# Patient Record
Sex: MALE | Race: ASIAN | NOT HISPANIC OR LATINO
[De-identification: names, ages, dates, MRNs, and addresses within clinical notes are randomized per-mention and may not be internally consistent; named-entity substitution may affect disease eponyms.]

---

## 2021-07-28 PROBLEM — Z00.00 ENCOUNTER FOR PREVENTIVE HEALTH EXAMINATION: Status: ACTIVE | Noted: 2021-07-28

## 2021-08-02 PROBLEM — Z87.19 HISTORY OF GASTRITIS: Status: RESOLVED | Noted: 2021-08-02 | Resolved: 2021-08-02

## 2021-08-02 PROBLEM — Z87.438 HISTORY OF BPH: Status: RESOLVED | Noted: 2021-08-02 | Resolved: 2021-08-02

## 2021-08-02 PROBLEM — Z86.73 HISTORY OF CVA (CEREBROVASCULAR ACCIDENT): Status: RESOLVED | Noted: 2021-08-02 | Resolved: 2021-08-02

## 2021-08-02 PROBLEM — Z86.79 HISTORY OF ATRIAL FIBRILLATION: Status: RESOLVED | Noted: 2021-08-02 | Resolved: 2021-08-02

## 2021-08-02 PROBLEM — N18.9 CHRONIC RENAL INSUFFICIENCY: Status: RESOLVED | Noted: 2021-08-02 | Resolved: 2021-08-02

## 2021-08-02 PROBLEM — Z86.03 HISTORY OF MONOCLONAL GAMMOPATHY: Status: RESOLVED | Noted: 2021-08-02 | Resolved: 2021-08-02

## 2021-08-02 PROBLEM — Z86.2 HISTORY OF THALASSEMIA: Status: RESOLVED | Noted: 2021-08-02 | Resolved: 2021-08-02

## 2021-08-02 PROBLEM — Z86.79 HISTORY OF HEART FAILURE: Status: RESOLVED | Noted: 2021-08-02 | Resolved: 2021-08-02

## 2021-08-02 RX ORDER — METOPROLOL SUCCINATE 200 MG/1
200 TABLET, EXTENDED RELEASE ORAL
Refills: 0 | Status: ACTIVE | COMMUNITY

## 2021-08-02 RX ORDER — TAMSULOSIN HYDROCHLORIDE 0.4 MG/1
0.4 CAPSULE ORAL
Refills: 0 | Status: ACTIVE | COMMUNITY

## 2021-08-02 RX ORDER — NEBIVOLOL HYDROCHLORIDE 20 MG/1
20 TABLET ORAL
Refills: 0 | Status: ACTIVE | COMMUNITY

## 2021-08-02 RX ORDER — DILTIAZEM HYDROCHLORIDE 120 MG/1
120 CAPSULE, EXTENDED RELEASE ORAL
Refills: 0 | Status: ACTIVE | COMMUNITY

## 2021-08-02 RX ORDER — LINAGLIPTIN 5 MG/1
5 TABLET, FILM COATED ORAL
Refills: 0 | Status: ACTIVE | COMMUNITY

## 2021-08-02 RX ORDER — MULTI VITAMIN/MINERAL SUPPLEMENT WITH ASCORBIC ACID, NIACIN, PYRIDOXINE, PANTOTHENIC ACID, FOLIC ACID, RIBOFLAVIN, THIAMIN, BIOTIN, COBALAMIN AND ZINC. 60; 20; 12.5; 10; 10; 1.7; 1.5; 1; .3; .006 MG/1; MG/1; MG/1; MG/1; MG/1; MG/1; MG/1; MG/1; MG/1; MG/1
TABLET, COATED ORAL
Refills: 0 | Status: ACTIVE | COMMUNITY

## 2021-08-02 RX ORDER — SIMVASTATIN 5 MG/1
5 TABLET, FILM COATED ORAL
Refills: 0 | Status: ACTIVE | COMMUNITY

## 2021-08-02 RX ORDER — ICOSAPENT ETHYL 1000 MG/1
1 CAPSULE ORAL
Refills: 0 | Status: ACTIVE | COMMUNITY

## 2021-08-02 RX ORDER — AZILSARTAN KAMEDOXOMIL 40 MG/1
40 TABLET ORAL
Refills: 0 | Status: ACTIVE | COMMUNITY

## 2021-08-02 RX ORDER — GLIPIZIDE 2.5 MG/1
2.5 TABLET, FILM COATED, EXTENDED RELEASE ORAL
Refills: 0 | Status: ACTIVE | COMMUNITY

## 2021-08-02 RX ORDER — RIVAROXABAN 15 MG/1
15 TABLET, FILM COATED ORAL
Refills: 0 | Status: ACTIVE | COMMUNITY

## 2021-08-02 RX ORDER — RANOLAZINE 500 MG/1
500 TABLET, EXTENDED RELEASE ORAL
Refills: 0 | Status: ACTIVE | COMMUNITY

## 2021-08-06 ENCOUNTER — APPOINTMENT (OUTPATIENT)
Dept: THORACIC SURGERY | Facility: CLINIC | Age: 76
End: 2021-08-06
Payer: MEDICARE

## 2021-08-06 VITALS
DIASTOLIC BLOOD PRESSURE: 90 MMHG | WEIGHT: 197 LBS | OXYGEN SATURATION: 97 % | TEMPERATURE: 97.8 F | HEIGHT: 67 IN | BODY MASS INDEX: 30.92 KG/M2 | HEART RATE: 74 BPM | SYSTOLIC BLOOD PRESSURE: 180 MMHG | RESPIRATION RATE: 17 BRPM

## 2021-08-06 DIAGNOSIS — Z86.79 PERSONAL HISTORY OF OTHER DISEASES OF THE CIRCULATORY SYSTEM: ICD-10-CM

## 2021-08-06 DIAGNOSIS — Z86.03 PERSONAL HISTORY OF NEOPLASM OF UNCERTAIN BEHAVIOR: ICD-10-CM

## 2021-08-06 DIAGNOSIS — Z87.438 PERSONAL HISTORY OF OTHER DISEASES OF MALE GENITAL ORGANS: ICD-10-CM

## 2021-08-06 DIAGNOSIS — Z87.19 PERSONAL HISTORY OF OTHER DISEASES OF THE DIGESTIVE SYSTEM: ICD-10-CM

## 2021-08-06 DIAGNOSIS — Z86.2 PERSONAL HISTORY OF DISEASES OF THE BLOOD AND BLOOD-FORMING ORGANS AND CERTAIN DISORDERS INVOLVING THE IMMUNE MECHANISM: ICD-10-CM

## 2021-08-06 DIAGNOSIS — Z09 ENCOUNTER FOR FOLLOW-UP EXAMINATION AFTER COMPLETED TREATMENT FOR CONDITIONS OTHER THAN MALIGNANT NEOPLASM: ICD-10-CM

## 2021-08-06 DIAGNOSIS — Z86.73 PERSONAL HISTORY OF TRANSIENT ISCHEMIC ATTACK (TIA), AND CEREBRAL INFARCTION W/OUT RESIDUAL DEFICITS: ICD-10-CM

## 2021-08-06 DIAGNOSIS — N18.9 CHRONIC KIDNEY DISEASE, UNSPECIFIED: ICD-10-CM

## 2021-08-06 PROCEDURE — 99203 OFFICE O/P NEW LOW 30 MIN: CPT

## 2021-08-06 NOTE — PHYSICAL EXAM
[General Appearance - Alert] : alert [] : no respiratory distress [Respiration, Rhythm And Depth] : normal respiratory rhythm and effort [Exaggerated Use Of Accessory Muscles For Inspiration] : no accessory muscle use [Auscultation Breath Sounds / Voice Sounds] : lungs were clear to auscultation bilaterally [Apical Impulse] : the apical impulse was normal [Heart Rate And Rhythm] : heart rate was normal and rhythm regular [Heart Sounds] : normal S1 and S2 [2+] : left 2+ [Abnormal Walk] : normal gait

## 2021-08-16 NOTE — CONSULT LETTER
[FreeTextEntry3] : Matt Munoz MD\par Professor, Cardiovascular & Thoracic Surgery\par [facility] [employer]\par Director of the [facility] \par Director of Thoracic Surgery, [facility]\par \par [facility]\par [address]\par [facility] 4th Floor\par [place] [address]\par Phone: [phone]\par Fax: [phone]

## 2021-08-16 NOTE — HISTORY OF PRESENT ILLNESS
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

## 2021-08-16 NOTE — ASSESSMENT
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 \par \par Patient admits to TSE. Patient denies cough, hemoptysis, weight change, nausea, vomiting, diarrhea, chest pain, fever, or any recent illnesses. \par \par Will obtain MRI chest images to review and will order a CT chest with IV contrast due to dyspnea on exertion. \par \par PLAN:\par 1. CT chest with IV contrast \par \par I, SAMM MACDONALD , am scribing for and in the presence of [Dr. Matt Munoz] the following sections: History of present illness, past Medical/family/surgical/family/social history, review of systems, vital signs, physical exam and disposition.\par  \par

## 2021-08-16 NOTE — END OF VISIT
[Time Spent: ___ minutes] : I have spent [unfilled] minutes of time on the encounter. [FreeTextEntry3] : \par I personally performed the services described in the documentation, reviewed the documentation recorded by the scribe in my presence and it accurately and completely records my words and actions.

## 2021-08-27 ENCOUNTER — APPOINTMENT (OUTPATIENT)
Dept: THORACIC SURGERY | Facility: CLINIC | Age: 76
End: 2021-08-27
Payer: MEDICARE

## 2021-08-27 VITALS
HEIGHT: 67 IN | TEMPERATURE: 98.3 F | RESPIRATION RATE: 17 BRPM | BODY MASS INDEX: 30.45 KG/M2 | WEIGHT: 194 LBS | OXYGEN SATURATION: 96 % | SYSTOLIC BLOOD PRESSURE: 155 MMHG | HEART RATE: 71 BPM | DIASTOLIC BLOOD PRESSURE: 93 MMHG

## 2021-08-27 PROCEDURE — 99215 OFFICE O/P EST HI 40 MIN: CPT

## 2021-08-27 NOTE — PHYSICAL EXAM
[] : no respiratory distress [Respiration, Rhythm And Depth] : normal respiratory rhythm and effort [Exaggerated Use Of Accessory Muscles For Inspiration] : no accessory muscle use [Apical Impulse] : the apical impulse was normal [Abnormal Walk] : normal gait [Musculoskeletal - Swelling] : no joint swelling seen

## 2021-08-30 NOTE — HISTORY OF PRESENT ILLNESS
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 Patient presents today for a follow-up visit with CT Chest. \par \par MRI Chest 7/7/21\par - anterior mediastinal lesion 2.1 x 1.1cm, compatible with thymic cyst. \par  \par CT Chest 8/12/21\par - Right perihilar soft tissue nodule measuring up to 2.7cm, which abuts the right superior pulmonary vein. Differential diagnosis includes pulmonary nodule vs aneurysm. \par - Anterior mediastinal lesion measuring up to 2.0cm with soft tissue attenuation.\par - Small bilateral pleural effusion\par - Sequela of prior granulomatous disease\par - CAD\par

## 2021-08-30 NOTE — CONSULT LETTER
[FreeTextEntry3] : Matt Munoz MD\par Professor, Cardiovascular & Thoracic Surgery\par [employer] [facility] [employer]\par Director of the [facility] \par Director of Thoracic Surgery, [facility]\par \par [facility]\par [address]\par [facility] 4th Floor\par [place] [address]\par Phone: [phone]\par Fax: [phone]

## 2021-08-30 NOTE — END OF VISIT
[Time Spent: ___ minutes] : I have spent [unfilled] minutes of time on the encounter. [FreeTextEntry3] : I, NARESH LEOS , am scribing for and in the presence of SHUKRI CHATTERJEE the following sections: history of present illness, past medical/family/surgical/family/social history, review of systems, vital signs, physical exam, and disposition.\par  \par I personally performed the services described in the documentation, reviewed the documentation recorded by the scribe in my presence and it accurately and completely records my words and actions.

## 2021-08-30 NOTE — ASSESSMENT
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 Patient presents today for a follow-up visit with CT Chest. \par \par MRI Chest 7/7/21\par - anterior mediastinal lesion 2.1 x 1.1cm, compatible with thymic cyst. \par  \par CT Chest 8/12/21\par - Right perihilar soft tissue nodule measuring up to 2.7cm, which abuts the right superior pulmonary vein. Differential diagnosis includes pulmonary nodule vs aneurysm. \par - Anterior mediastinal lesion measuring up to 2.0cm with soft tissue attenuation.\par - Small bilateral pleural effusion\par - Sequela of prior granulomatous disease\par - CAD\par \par \par Patient doing well. Denies SOB, cough, hemoptysis, chest discomfort, fever/chills. \par \par CT Chest 8/12/21 reviewed with patient - right perihilar soft tissue mass concerning for lung nodule vs aneurysm. Will request previous scans for review. Will order PET to further evaluate. \par \par I have reviewed the patient's medical records and diagnostic images at the time of this office consultation and have made the following recommendation.\par Plan:\par 1. PET \par 2. Request CTA Chest 7/2\par 3. RTC to discuss next steps.

## 2021-09-17 ENCOUNTER — APPOINTMENT (OUTPATIENT)
Dept: THORACIC SURGERY | Facility: CLINIC | Age: 76
End: 2021-09-17
Payer: MEDICARE

## 2021-09-17 VITALS
HEART RATE: 70 BPM | TEMPERATURE: 98 F | WEIGHT: 195 LBS | SYSTOLIC BLOOD PRESSURE: 185 MMHG | OXYGEN SATURATION: 98 % | RESPIRATION RATE: 17 BRPM | HEIGHT: 67 IN | BODY MASS INDEX: 30.61 KG/M2 | DIASTOLIC BLOOD PRESSURE: 79 MMHG

## 2021-09-17 DIAGNOSIS — R91.1 SOLITARY PULMONARY NODULE: ICD-10-CM

## 2021-09-17 DIAGNOSIS — R91.8 OTHER NONSPECIFIC ABNORMAL FINDING OF LUNG FIELD: ICD-10-CM

## 2021-09-17 DIAGNOSIS — R93.89 ABNORMAL FINDINGS ON DIAGNOSTIC IMAGING OF OTHER SPECIFIED BODY STRUCTURES: ICD-10-CM

## 2021-09-17 PROCEDURE — 99213 OFFICE O/P EST LOW 20 MIN: CPT

## 2021-09-20 PROBLEM — R91.1 LUNG NODULE SEEN ON IMAGING STUDY: Status: RESOLVED | Noted: 2021-09-17 | Resolved: 2021-09-20

## 2021-09-20 PROBLEM — R93.89 ABNORMAL CHEST CT: Status: ACTIVE | Noted: 2021-09-17

## 2021-09-20 NOTE — CONSULT LETTER
[Courtesy Letter:] : I had the pleasure of seeing your patient, [unfilled], in my office today. [Please see my note below.] : Please see my note below. [Consult Closing:] : Thank you very much for allowing me to participate in the care of this patient.  If you have any questions, please do not hesitate to contact me. [Sincerely,] : Sincerely, [FreeTextEntry3] : Matt Munoz MD\par Professor, Cardiovascular & Thoracic Surgery\par [facility] [employer]\par Director of the [facility] \par Director of Thoracic Surgery, [facility]\par \par [facility]\par [address]\par [facility] 4th Floor\par [place] [address]\par Phone: [phone]\par Fax: [phone]

## 2021-09-20 NOTE — ASSESSMENT
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 \par  \par CT Chest 8/12/21\par - Right perihilar soft tissue nodule measuring up to 2.7cm, which abuts the right superior pulmonary vein. Differential diagnosis includes pulmonary nodule vs aneurysm. \par - Anterior mediastinal lesion measuring up to 2.0cm with soft tissue attenuation.\par - Small bilateral pleural effusion\par - Sequela of prior granulomatous disease\par - CAD\par \par PET 09/11/2021\par - 2.5 x 1.6 cm low-attenuation collection may represent a complex cyst - no FDG activity\par - stable 2.7 x 2.2 cm well-circumscribed low-attenuation nodule abutting the right superior pulmonary vein, \par SUV 3.2\par - trace bilateral pleural effusions decreased in volume \par \par PET 09/11/2021 and CTA Chest 07/02/2021 were reviewed with the patient, and the thymic cyst is stable. 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 This is was seen on all the scans and will be followed up on when the patient RTC in 1 year.  \par \par I have reviewed the patient’s medical records and diagnostic images during the time of this office visit and have made the following recommendations.\par Plan:\par 1. RTC in 1 year with MRI chest w/o contrast

## 2021-09-20 NOTE — END OF VISIT
[FreeTextEntry3] : I, DOROTHEA ABRAHAM, am scribing for and in the presence of Dr. SHUKRI CHATTERJEE the following sections: history of present illness, past medical/surgical/family/social/ allergy history, review of systems, vital signs, physical exam, and disposition.\par \par I personally performed the services described in the documentation, reviewed the documentation recorded by the scribe in my presence and it accurately and completely records my words and actions.

## 2021-09-20 NOTE — PHYSICAL EXAM
[Sclera] : the sclera and conjunctiva were normal [Neck Appearance] : the appearance of the neck was normal [] : no respiratory distress [Respiration, Rhythm And Depth] : normal respiratory rhythm and effort [Exaggerated Use Of Accessory Muscles For Inspiration] : no accessory muscle use [Heart Rate And Rhythm] : heart rate was normal and rhythm regular [Heart Sounds] : normal S1 and S2 [Examination Of The Chest] : the chest was normal in appearance [2+] : left 2+ [No Pulse Delay] : no pulse delay [Bowel Sounds] : normal bowel sounds [Abdomen Soft] : soft [Abdomen Tenderness] : non-tender [Abnormal Walk] : normal gait [Skin Color & Pigmentation] : normal skin color and pigmentation [Sensation] : the sensory exam was normal to light touch and pinprick [Motor Exam] : the motor exam was normal [Oriented To Time, Place, And Person] : oriented to person, place, and time [Fingers] :  capillary refill of the fingers was normal [FreeTextEntry1] : na

## 2021-09-20 NOTE — HISTORY OF PRESENT ILLNESS
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 \par  \par CT Chest 8/12/21\par - Right perihilar soft tissue nodule measuring up to 2.7cm, which abuts the right superior pulmonary vein. Differential diagnosis includes pulmonary nodule vs aneurysm. \par - Anterior mediastinal lesion measuring up to 2.0cm with soft tissue attenuation.\par - Small bilateral pleural effusion\par - Sequela of prior granulomatous disease\par - CAD\par \par PET 09/11/2021\par - 2.5 x 1.6 cm low-attenuation collection may represent a complex cyst - no FDG activity\par - stable 2.7 x 2.2 cm well-circumscribed low-attenuation nodule abutting the right superior pulmonary vein, \par SUV 3.2\par - trace bilateral pleural effusions decreased in volume

## 2022-09-08 VITALS — HEIGHT: 69 IN | WEIGHT: 194.01 LBS

## 2022-09-08 RX ORDER — CHLORHEXIDINE GLUCONATE 213 G/1000ML
1 SOLUTION TOPICAL ONCE
Refills: 0 | Status: DISCONTINUED | OUTPATIENT
Start: 2022-09-14 | End: 2022-09-28

## 2022-09-08 NOTE — H&P ADULT - NSICDXPASTMEDICALHX_GEN_ALL_CORE_FT
PAST MEDICAL HISTORY:  Afib     CAD (coronary artery disease)     Chronic kidney disease (CKD)     DM (diabetes mellitus)     H/O varicose veins     History of TIAs     HTN (hypertension)     Monoclonal gammopathy     PVD (peripheral vascular disease)

## 2022-09-08 NOTE — H&P ADULT - HISTORY OF PRESENT ILLNESS
COVID:   Cardiologist: Dr. Bola Peres  Pharmacy:  Escort:     78 yo Mandarian speaking male with PMHx of HTN, DM, Afib (on Xarelto, last dose ______), TIA, PVD s/p PTA L SFA, L RANULFO, R SFA, and R AT, CKD stage 3, CAD s/p dx cath at [facility] 9/2011 per MD note, mild CAD with mLAD myocardial bridge, varicose veins, monocolonal gammopathy presented to her cardiologist office's, Dr. Peres c/o intermittent substernal chest pain for few days. Describing it as pressure, non-radiating, nonexertional with no precipitating factors, and relief with rest. Denies palpitations, dizziness, syncope, orthopnea, PND, LE edema. NST 8/3/2022: moderate perfusion abnormality of moderate inintensity is present in the anterior region which was reversible in the rest images. ECHO 4/12/2022: EF 67%, severely dilated left atrium, aortic valve sclerosis, trace MR and TR.    COVID:   Cardiologist: Dr. Bola Peres  Pharmacy:  Escort:     76 yo Mandarian speaking male with PMHx of HTN, DM, Afib (on Xarelto, last dose ______), TIA, PVD s/p PTA L SFA, L RANULFO, R SFA, and R AT, CKD stage 3, CAD s/p dx cath at [facility] 9/2011 per MD note, mild CAD with mLAD myocardial bridge, varicose veins, monocolonal gammopathy presented to her cardiologist office's, Dr. Peres c/o intermittent substernal chest pain for few days. Describing it as pressure, non-radiating, nonexertional with no precipitating factors, and relief with rest. Denies palpitations, dizziness, syncope, orthopnea, PND, LE edema. NST 8/3/2022: moderate perfusion abnormality of moderate inintensity is present in the anterior region which was reversible in the rest images. ECHO 4/12/2022: EF 67%, severely dilated left atrium, aortic valve sclerosis, trace MR and TR.     In light of pt's risk factors, CCS class II anginal symptoms, and abnormal NST result, pt is now referred for cardiac cath with possible intervention is clinically indicated.    COVID: (-) in chart   Cardiologist: Dr. Bola Peres  Pharmacy: [id number] (in [place])    78 yo [place] speaking male with PMHx of HTN, DM, Afib (on Xarelto, last dose LAST NIGHT 9/13/22), TIA, PVD s/p PTA L SFA, L RANULFO, R SFA, and R AT, CKD stage 3, CAD s/p dx cath at [facility] 9/2011 per MD note, mild CAD with mLAD myocardial bridge, varicose veins, monocolonal gammopathy presented to her cardiologist office's, Dr. Peres c/o intermittent substernal chest pain for few days. Describing it as pressure, non-radiating, nonexertional with no precipitating factors, and relief with rest. Denies palpitations, dizziness, syncope, orthopnea, PND, LE edema. NST 8/3/2022: moderate perfusion abnormality of moderate inintensity is present in the anterior region which was reversible in the rest images. ECHO 4/12/2022: EF 67%, severely dilated left atrium, aortic valve sclerosis, trace MR and TR.     In light of pt's risk factors, CCS class II anginal symptoms, and abnormal NST result, pt is now referred for cardiac cath with possible intervention is clinically indicated.    COVID: (-) in chart   Cardiologist: Dr. Bola Peres  Pharmacy: [id number] (in [place])    76 yo [place] speaking male with PMHx of HTN, DM, Afib (on Xarelto, last dose LAST NIGHT 9/13/22), TIA, PVD s/p PTA L SFA, L RANULFO, R SFA, and R AT, CKD stage 3, CAD s/p dx cath at [facility] 9/2011 per MD note, mild CAD with mLAD myocardial bridge, varicose veins, monocolonal gammopathy presented to her cardiologist office's, Dr. Peres c/o intermittent substernal chest pain for few days. Describing it as pressure, non-radiating, nonexertional with no precipitating factors, and relief with rest. Denies palpitations, dizziness, syncope, orthopnea, PND, LE edema. NST 8/3/2022: moderate perfusion abnormality of moderate inintensity is present in the anterior region which was reversible in the rest images. ECHO 4/12/2022: EF 67%, severely dilated left atrium, aortic valve sclerosis, trace MR and TR.     In light of pt's risk factors, CCS class II anginal symptoms, and abnormal NST result, pt is now referred for cardiac cath with possible intervention is clinically indicated.

## 2022-09-08 NOTE — H&P ADULT - NSHPLABSRESULTS_GEN_ALL_CORE
13.7   5.17  )-----------( 135      ( 14 Sep 2022 07:46 )             43.2     139  |  104  |  29<H>  ----------------------------<  139<H>  3.8   |  27  |  1.63<H>    Ca    9.3      14 Sep 2022 07:46    Mg     2.2     09-14    TPro  8.0  /  Alb  4.8  /  TBili  0.8  /  DBili  x   /  AST  23  /  ALT  26  /  AlkPhos  78  09-14    PT/INR - ( 14 Sep 2022 07:46 )   PT: 11.7 sec;   INR: 0.98       PTT - ( 14 Sep 2022 07:46 )  PTT:28.7 sec    CARDIAC MARKERS ( 14 Sep 2022 07:46 )  x     / x     / 120 U/L / x     / 1.7 ng/mL    EKG: Afib; no acute ischemic changes noted.

## 2022-09-12 ENCOUNTER — APPOINTMENT (OUTPATIENT)
Dept: THORACIC SURGERY | Facility: CLINIC | Age: 77
End: 2022-09-12

## 2022-09-12 PROCEDURE — 99442: CPT

## 2022-09-12 NOTE — ASSESSMENT
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 \par \par MRI chest w/o and w/ contrast on 09/08/22 was reviewed with the patient. The anterior mediastinal/thymic cyst is stable at 3.3cm. The ateriovenous varix is stable at 2.7cm as well. Patient reported will have cardiac cath later this month for CAD. He otherwise denies fatigue, weakness, drooping eyes, facial redness/swelling, trouble swallowing. I suggested a follow up in one year with MRI chest w/o contrast. Pt verbally understood and agreed with the plan. \par \par Plan:\par MRI chest without contrast in one year

## 2022-09-12 NOTE — HISTORY OF PRESENT ILLNESS
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 \par  \par CT Chest 8/12/21\par - Right perihilar soft tissue nodule measuring up to 2.7cm, which abuts the right superior pulmonary vein. Differential diagnosis includes pulmonary nodule vs aneurysm. \par - Anterior mediastinal lesion measuring up to 2.0cm with soft tissue attenuation.\par - Small bilateral pleural effusion\par - Sequela of prior granulomatous disease\par - CAD\par \par PET 09/11/2021\par - 2.5 x 1.6 cm low-attenuation collection may represent a complex cyst - no FDG activity\par - stable 2.7 x 2.2 cm well-circumscribed low-attenuation nodule abutting the right superior pulmonary vein, \par SUV 3.2\par - trace bilateral pleural effusions decreased in volume \par \par He presents today with a chest MRI. Patient reports will have cardiac cath later this month for CAD. He otherwise denies fatigue, weakness, drooping eyes, facial redness/swelling, trouble swallowing. \par \par MRI chest w/o and w/ contrast on 09/08/22\par - Stable 3.3 cm anterior mediastinal/thymic cyst.\par - 2.7 cm avidly enhancing right perihilar lesion located adjacent to the right middle pulmonary artery and right superior pulmonary vein.  This is grossly stable in size since 07/2021. \par - Trace bilateral pleural effusions. \par - Stable mild cardiomegaly. \par

## 2022-09-12 NOTE — REASON FOR VISIT
[Follow-Up: _____] : a [unfilled] follow-up visit [Home] : at home, [unfilled] , at the time of the visit. [Medical Office: ([facility])___] : at the medical office located in  [Verbal consent obtained from patient] : the patient, [unfilled]

## 2022-09-13 PROBLEM — I25.10 ATHEROSCLEROTIC HEART DISEASE OF NATIVE CORONARY ARTERY WITHOUT ANGINA PECTORIS: Chronic | Status: ACTIVE | Noted: 2022-09-08

## 2022-09-13 PROBLEM — E11.9 TYPE 2 DIABETES MELLITUS WITHOUT COMPLICATIONS: Chronic | Status: ACTIVE | Noted: 2022-09-08

## 2022-09-13 PROBLEM — I48.91 UNSPECIFIED ATRIAL FIBRILLATION: Chronic | Status: ACTIVE | Noted: 2022-09-08

## 2022-09-13 PROBLEM — D47.2 MONOCLONAL GAMMOPATHY: Chronic | Status: ACTIVE | Noted: 2022-09-08

## 2022-09-13 PROBLEM — I10 ESSENTIAL (PRIMARY) HYPERTENSION: Chronic | Status: ACTIVE | Noted: 2022-09-08

## 2022-09-13 PROBLEM — N18.9 CHRONIC KIDNEY DISEASE, UNSPECIFIED: Chronic | Status: ACTIVE | Noted: 2022-09-08

## 2022-09-13 PROBLEM — Z86.79 PERSONAL HISTORY OF OTHER DISEASES OF THE CIRCULATORY SYSTEM: Chronic | Status: ACTIVE | Noted: 2022-09-08

## 2022-09-13 PROBLEM — I73.9 PERIPHERAL VASCULAR DISEASE, UNSPECIFIED: Chronic | Status: ACTIVE | Noted: 2022-09-08

## 2022-09-13 PROBLEM — Z86.73 PERSONAL HISTORY OF TRANSIENT ISCHEMIC ATTACK (TIA), AND CEREBRAL INFARCTION WITHOUT RESIDUAL DEFICITS: Chronic | Status: ACTIVE | Noted: 2022-09-08

## 2022-09-14 ENCOUNTER — OUTPATIENT (OUTPATIENT)
Dept: OUTPATIENT SERVICES | Facility: HOSPITAL | Age: 77
LOS: 1 days | Discharge: HOME | End: 2022-09-14
Payer: MEDICARE

## 2022-09-14 LAB
A1C WITH ESTIMATED AVERAGE GLUCOSE RESULT: 5.7 % — HIGH (ref 4–5.6)
ALBUMIN SERPL ELPH-MCNC: 4.8 G/DL — SIGNIFICANT CHANGE UP (ref 3.3–5)
ALP SERPL-CCNC: 78 U/L — SIGNIFICANT CHANGE UP (ref 40–120)
ALT FLD-CCNC: 26 U/L — SIGNIFICANT CHANGE UP (ref 10–45)
ANION GAP SERPL CALC-SCNC: 11 MMOL/L — SIGNIFICANT CHANGE UP (ref 5–17)
ANION GAP SERPL CALC-SCNC: 8 MMOL/L — SIGNIFICANT CHANGE UP (ref 5–17)
ANISOCYTOSIS BLD QL: SLIGHT — SIGNIFICANT CHANGE UP
APTT BLD: 28.7 SEC — SIGNIFICANT CHANGE UP (ref 27.5–35.5)
AST SERPL-CCNC: 23 U/L — SIGNIFICANT CHANGE UP (ref 10–40)
BASOPHILS # BLD AUTO: 0 K/UL — SIGNIFICANT CHANGE UP (ref 0–0.2)
BASOPHILS NFR BLD AUTO: 0 % — SIGNIFICANT CHANGE UP (ref 0–2)
BILIRUB SERPL-MCNC: 0.8 MG/DL — SIGNIFICANT CHANGE UP (ref 0.2–1.2)
BUN SERPL-MCNC: 27 MG/DL — HIGH (ref 7–23)
BUN SERPL-MCNC: 29 MG/DL — HIGH (ref 7–23)
CALCIUM SERPL-MCNC: 8.8 MG/DL — SIGNIFICANT CHANGE UP (ref 8.4–10.5)
CALCIUM SERPL-MCNC: 9.3 MG/DL — SIGNIFICANT CHANGE UP (ref 8.4–10.5)
CHLORIDE SERPL-SCNC: 104 MMOL/L — SIGNIFICANT CHANGE UP (ref 96–108)
CHLORIDE SERPL-SCNC: 105 MMOL/L — SIGNIFICANT CHANGE UP (ref 96–108)
CHOLEST SERPL-MCNC: 117 MG/DL — SIGNIFICANT CHANGE UP
CK MB CFR SERPL CALC: 1.7 NG/ML — SIGNIFICANT CHANGE UP (ref 0–6.7)
CK SERPL-CCNC: 120 U/L — SIGNIFICANT CHANGE UP (ref 30–200)
CO2 SERPL-SCNC: 21 MMOL/L — LOW (ref 22–31)
CO2 SERPL-SCNC: 27 MMOL/L — SIGNIFICANT CHANGE UP (ref 22–31)
CREAT SERPL-MCNC: 1.46 MG/DL — HIGH (ref 0.5–1.3)
CREAT SERPL-MCNC: 1.63 MG/DL — HIGH (ref 0.5–1.3)
EGFR: 43 ML/MIN/1.73M2 — LOW
EGFR: 50 ML/MIN/1.73M2 — LOW
EOSINOPHIL # BLD AUTO: 0 K/UL — SIGNIFICANT CHANGE UP (ref 0–0.5)
EOSINOPHIL NFR BLD AUTO: 0 % — SIGNIFICANT CHANGE UP (ref 0–6)
ESTIMATED AVERAGE GLUCOSE: 117 MG/DL — HIGH (ref 68–114)
GLUCOSE BLDC GLUCOMTR-MCNC: 114 MG/DL — HIGH (ref 70–99)
GLUCOSE SERPL-MCNC: 120 MG/DL — HIGH (ref 70–99)
GLUCOSE SERPL-MCNC: 139 MG/DL — HIGH (ref 70–99)
HCT VFR BLD CALC: 38.4 % — LOW (ref 39–50)
HCT VFR BLD CALC: 43.2 % — SIGNIFICANT CHANGE UP (ref 39–50)
HCV AB S/CO SERPL IA: 0.05 S/CO — SIGNIFICANT CHANGE UP
HCV AB SERPL-IMP: SIGNIFICANT CHANGE UP
HDLC SERPL-MCNC: 40 MG/DL — LOW
HGB BLD-MCNC: 12.2 G/DL — LOW (ref 13–17)
HGB BLD-MCNC: 13.7 G/DL — SIGNIFICANT CHANGE UP (ref 13–17)
INR BLD: 0.98 — SIGNIFICANT CHANGE UP (ref 0.88–1.16)
ISTAT ACTK (ACTIVATED CLOTTING TIME KAOLIN): 271 SEC — HIGH (ref 74–137)
ISTAT ACTK (ACTIVATED CLOTTING TIME KAOLIN): 306 SEC — HIGH (ref 74–137)
ISTAT INR: 1.3 — HIGH (ref 0.88–1.16)
ISTAT PT: 14.9 SEC — HIGH (ref 10–12.9)
LIPID PNL WITH DIRECT LDL SERPL: 61 MG/DL — SIGNIFICANT CHANGE UP
LYMPHOCYTES # BLD AUTO: 0.9 K/UL — LOW (ref 1–3.3)
LYMPHOCYTES # BLD AUTO: 17.4 % — SIGNIFICANT CHANGE UP (ref 13–44)
MACROCYTES BLD QL: SLIGHT — SIGNIFICANT CHANGE UP
MAGNESIUM SERPL-MCNC: 2 MG/DL — SIGNIFICANT CHANGE UP (ref 1.6–2.6)
MAGNESIUM SERPL-MCNC: 2.2 MG/DL — SIGNIFICANT CHANGE UP (ref 1.6–2.6)
MANUAL SMEAR VERIFICATION: SIGNIFICANT CHANGE UP
MCHC RBC-ENTMCNC: 22.4 PG — LOW (ref 27–34)
MCHC RBC-ENTMCNC: 22.7 PG — LOW (ref 27–34)
MCHC RBC-ENTMCNC: 31.7 GM/DL — LOW (ref 32–36)
MCHC RBC-ENTMCNC: 31.8 GM/DL — LOW (ref 32–36)
MCV RBC AUTO: 70.5 FL — LOW (ref 80–100)
MCV RBC AUTO: 71.5 FL — LOW (ref 80–100)
MICROCYTES BLD QL: SLIGHT — SIGNIFICANT CHANGE UP
MONOCYTES # BLD AUTO: 0.27 K/UL — SIGNIFICANT CHANGE UP (ref 0–0.9)
MONOCYTES NFR BLD AUTO: 5.2 % — SIGNIFICANT CHANGE UP (ref 2–14)
NEUTROPHILS # BLD AUTO: 4 K/UL — SIGNIFICANT CHANGE UP (ref 1.8–7.4)
NEUTROPHILS NFR BLD AUTO: 77.4 % — HIGH (ref 43–77)
NON HDL CHOLESTEROL: 77 MG/DL — SIGNIFICANT CHANGE UP
NRBC # BLD: 0 /100 WBCS — SIGNIFICANT CHANGE UP (ref 0–0)
OVALOCYTES BLD QL SMEAR: SLIGHT — SIGNIFICANT CHANGE UP
PLAT MORPH BLD: NORMAL — SIGNIFICANT CHANGE UP
PLATELET # BLD AUTO: 116 K/UL — LOW (ref 150–400)
PLATELET # BLD AUTO: 135 K/UL — LOW (ref 150–400)
POLYCHROMASIA BLD QL SMEAR: SLIGHT — SIGNIFICANT CHANGE UP
POTASSIUM SERPL-MCNC: 3.6 MMOL/L — SIGNIFICANT CHANGE UP (ref 3.5–5.3)
POTASSIUM SERPL-MCNC: 3.8 MMOL/L — SIGNIFICANT CHANGE UP (ref 3.5–5.3)
POTASSIUM SERPL-SCNC: 3.6 MMOL/L — SIGNIFICANT CHANGE UP (ref 3.5–5.3)
POTASSIUM SERPL-SCNC: 3.8 MMOL/L — SIGNIFICANT CHANGE UP (ref 3.5–5.3)
PROT SERPL-MCNC: 8 G/DL — SIGNIFICANT CHANGE UP (ref 6–8.3)
PROTHROM AB SERPL-ACNC: 11.7 SEC — SIGNIFICANT CHANGE UP (ref 10.5–13.4)
RBC # BLD: 5.45 M/UL — SIGNIFICANT CHANGE UP (ref 4.2–5.8)
RBC # BLD: 6.04 M/UL — HIGH (ref 4.2–5.8)
RBC # FLD: 16.2 % — HIGH (ref 10.3–14.5)
RBC # FLD: 17.3 % — HIGH (ref 10.3–14.5)
RBC BLD AUTO: ABNORMAL
SODIUM SERPL-SCNC: 137 MMOL/L — SIGNIFICANT CHANGE UP (ref 135–145)
SODIUM SERPL-SCNC: 139 MMOL/L — SIGNIFICANT CHANGE UP (ref 135–145)
SPHEROCYTES BLD QL SMEAR: SLIGHT — SIGNIFICANT CHANGE UP
TRIGL SERPL-MCNC: 79 MG/DL — SIGNIFICANT CHANGE UP
WBC # BLD: 4.4 K/UL — SIGNIFICANT CHANGE UP (ref 3.8–10.5)
WBC # BLD: 5.17 K/UL — SIGNIFICANT CHANGE UP (ref 3.8–10.5)
WBC # FLD AUTO: 4.4 K/UL — SIGNIFICANT CHANGE UP (ref 3.8–10.5)
WBC # FLD AUTO: 5.17 K/UL — SIGNIFICANT CHANGE UP (ref 3.8–10.5)

## 2022-09-14 PROCEDURE — C1874: CPT

## 2022-09-14 PROCEDURE — C1894: CPT

## 2022-09-14 PROCEDURE — C1725: CPT

## 2022-09-14 PROCEDURE — 92978 ENDOLUMINL IVUS OCT C 1ST: CPT | Mod: 26,LD

## 2022-09-14 PROCEDURE — 36415 COLL VENOUS BLD VENIPUNCTURE: CPT

## 2022-09-14 PROCEDURE — 85025 COMPLETE CBC W/AUTO DIFF WBC: CPT

## 2022-09-14 PROCEDURE — 80048 BASIC METABOLIC PNL TOTAL CA: CPT

## 2022-09-14 PROCEDURE — 83735 ASSAY OF MAGNESIUM: CPT

## 2022-09-14 PROCEDURE — 86803 HEPATITIS C AB TEST: CPT

## 2022-09-14 PROCEDURE — 99152 MOD SED SAME PHYS/QHP 5/>YRS: CPT

## 2022-09-14 PROCEDURE — 93458 L HRT ARTERY/VENTRICLE ANGIO: CPT | Mod: 26,59

## 2022-09-14 PROCEDURE — 85027 COMPLETE CBC AUTOMATED: CPT

## 2022-09-14 PROCEDURE — 85347 COAGULATION TIME ACTIVATED: CPT

## 2022-09-14 PROCEDURE — 82553 CREATINE MB FRACTION: CPT

## 2022-09-14 PROCEDURE — 93010 ELECTROCARDIOGRAM REPORT: CPT | Mod: 77

## 2022-09-14 PROCEDURE — C1887: CPT

## 2022-09-14 PROCEDURE — 83036 HEMOGLOBIN GLYCOSYLATED A1C: CPT

## 2022-09-14 PROCEDURE — C9600: CPT | Mod: LD

## 2022-09-14 PROCEDURE — 92978 ENDOLUMINL IVUS OCT C 1ST: CPT | Mod: LD

## 2022-09-14 PROCEDURE — 93010 ELECTROCARDIOGRAM REPORT: CPT

## 2022-09-14 PROCEDURE — 82550 ASSAY OF CK (CPK): CPT

## 2022-09-14 PROCEDURE — 82962 GLUCOSE BLOOD TEST: CPT

## 2022-09-14 PROCEDURE — 80053 COMPREHEN METABOLIC PANEL: CPT

## 2022-09-14 PROCEDURE — 80061 LIPID PANEL: CPT

## 2022-09-14 PROCEDURE — 82565 ASSAY OF CREATININE: CPT

## 2022-09-14 PROCEDURE — 93458 L HRT ARTERY/VENTRICLE ANGIO: CPT | Mod: 59

## 2022-09-14 PROCEDURE — C1769: CPT

## 2022-09-14 PROCEDURE — 93005 ELECTROCARDIOGRAM TRACING: CPT

## 2022-09-14 PROCEDURE — 92928 PRQ TCAT PLMT NTRAC ST 1 LES: CPT | Mod: LD

## 2022-09-14 PROCEDURE — 85730 THROMBOPLASTIN TIME PARTIAL: CPT

## 2022-09-14 PROCEDURE — C1753: CPT

## 2022-09-14 PROCEDURE — 85610 PROTHROMBIN TIME: CPT

## 2022-09-14 RX ORDER — SODIUM CHLORIDE 9 MG/ML
500 INJECTION INTRAMUSCULAR; INTRAVENOUS; SUBCUTANEOUS
Refills: 0 | Status: DISCONTINUED | OUTPATIENT
Start: 2022-09-14 | End: 2022-09-14

## 2022-09-14 RX ORDER — ROSUVASTATIN CALCIUM 5 MG/1
1 TABLET ORAL
Qty: 0 | Refills: 0 | DISCHARGE

## 2022-09-14 RX ORDER — MECLIZINE HCL 12.5 MG
1 TABLET ORAL
Qty: 0 | Refills: 0 | DISCHARGE

## 2022-09-14 RX ORDER — ASPIRIN/CALCIUM CARB/MAGNESIUM 324 MG
325 TABLET ORAL ONCE
Refills: 0 | Status: COMPLETED | OUTPATIENT
Start: 2022-09-14 | End: 2022-09-14

## 2022-09-14 RX ORDER — RANOLAZINE 500 MG/1
1 TABLET, FILM COATED, EXTENDED RELEASE ORAL
Qty: 0 | Refills: 0 | DISCHARGE

## 2022-09-14 RX ORDER — TAMSULOSIN HYDROCHLORIDE 0.4 MG/1
1 CAPSULE ORAL
Qty: 0 | Refills: 0 | DISCHARGE

## 2022-09-14 RX ORDER — LINAGLIPTIN 5 MG/1
1 TABLET, FILM COATED ORAL
Qty: 0 | Refills: 0 | DISCHARGE

## 2022-09-14 RX ORDER — HYDRALAZINE HCL 50 MG
1 TABLET ORAL
Qty: 0 | Refills: 0 | DISCHARGE

## 2022-09-14 RX ORDER — DIPHENHYDRAMINE HCL 50 MG
1 CAPSULE ORAL
Qty: 0 | Refills: 0 | DISCHARGE

## 2022-09-14 RX ORDER — ESOMEPRAZOLE MAGNESIUM 40 MG/1
1 CAPSULE, DELAYED RELEASE ORAL
Qty: 0 | Refills: 0 | DISCHARGE

## 2022-09-14 RX ORDER — SODIUM CHLORIDE 9 MG/ML
500 INJECTION INTRAMUSCULAR; INTRAVENOUS; SUBCUTANEOUS
Refills: 0 | Status: DISCONTINUED | OUTPATIENT
Start: 2022-09-14 | End: 2022-09-28

## 2022-09-14 RX ORDER — SENNA PLUS 8.6 MG/1
2 TABLET ORAL
Qty: 0 | Refills: 0 | DISCHARGE

## 2022-09-14 RX ORDER — POTASSIUM CHLORIDE 20 MEQ
20 PACKET (EA) ORAL ONCE
Refills: 0 | Status: COMPLETED | OUTPATIENT
Start: 2022-09-14 | End: 2022-09-14

## 2022-09-14 RX ORDER — AMLODIPINE BESYLATE 2.5 MG/1
1 TABLET ORAL
Qty: 0 | Refills: 0 | DISCHARGE

## 2022-09-14 RX ORDER — ICOSAPENT ETHYL 500 MG/1
2 CAPSULE, LIQUID FILLED ORAL
Qty: 0 | Refills: 0 | DISCHARGE

## 2022-09-14 RX ORDER — CLOPIDOGREL BISULFATE 75 MG/1
600 TABLET, FILM COATED ORAL ONCE
Refills: 0 | Status: COMPLETED | OUTPATIENT
Start: 2022-09-14 | End: 2022-09-14

## 2022-09-14 RX ORDER — METOPROLOL TARTRATE 50 MG
125 TABLET ORAL
Qty: 0 | Refills: 0 | DISCHARGE

## 2022-09-14 RX ORDER — NITROGLYCERIN 6.5 MG
1 CAPSULE, EXTENDED RELEASE ORAL
Qty: 0 | Refills: 0 | DISCHARGE

## 2022-09-14 RX ORDER — CLOPIDOGREL BISULFATE 75 MG/1
1 TABLET, FILM COATED ORAL
Qty: 30 | Refills: 11
Start: 2022-09-14 | End: 2023-09-08

## 2022-09-14 RX ORDER — SODIUM CHLORIDE 9 MG/ML
500 INJECTION INTRAMUSCULAR; INTRAVENOUS; SUBCUTANEOUS ONCE
Refills: 0 | Status: COMPLETED | OUTPATIENT
Start: 2022-09-14 | End: 2022-09-14

## 2022-09-14 RX ORDER — FONDAPARINUX SODIUM 2.5 MG/.5ML
1 INJECTION, SOLUTION SUBCUTANEOUS
Qty: 0 | Refills: 0 | DISCHARGE

## 2022-09-14 RX ADMIN — Medication 325 MILLIGRAM(S): at 09:11

## 2022-09-14 RX ADMIN — SODIUM CHLORIDE 250 MILLILITER(S): 9 INJECTION INTRAMUSCULAR; INTRAVENOUS; SUBCUTANEOUS at 11:03

## 2022-09-14 RX ADMIN — SODIUM CHLORIDE 100 MILLILITER(S): 9 INJECTION INTRAMUSCULAR; INTRAVENOUS; SUBCUTANEOUS at 09:13

## 2022-09-14 RX ADMIN — CLOPIDOGREL BISULFATE 600 MILLIGRAM(S): 75 TABLET, FILM COATED ORAL at 09:10

## 2022-09-14 RX ADMIN — Medication 20 MILLIEQUIVALENT(S): at 13:02

## 2022-09-14 RX ADMIN — SODIUM CHLORIDE 2000 MILLILITER(S): 9 INJECTION INTRAMUSCULAR; INTRAVENOUS; SUBCUTANEOUS at 09:12

## 2022-09-14 NOTE — PROGRESS NOTE ADULT - SUBJECTIVE AND OBJECTIVE BOX
Interventional Cardiology PA Post PCI SDA Discharge Note    Patient without complaints. Ambulated and voided without difficulties    Afebrile, VSS    PRESCRIPTIONS/HOME MEDICATIONS:  amLODIPine 10 mg oral tablet: 1 tab(s) orally once a day  Crestor 5 mg oral tablet: 1 tab(s) orally once a day  glipiZIDE 2.5 mg oral tablet, extended release: 1 tab(s) orally once a day  hydrALAZINE 50 mg oral tablet: 1 tab(s) orally 3 times a day  Metoprolol Succinate ER: 125 milligram(s) orally once a day  NexIUM 40 mg oral delayed release capsule: 1 cap(s) orally once a day  Nitrostat 0.4 mg sublingual tablet: 1 tab(s) sublingual every 5 minutes, As Needed  Ranexa 1000 mg oral tablet, extended release: 1 tab(s) orally 2 times a day  Senna Lax 8.6 mg oral tablet: 2 tab(s) orally once a day (at bedtime)  tamsulosin 0.4 mg oral capsule: 1 cap(s) orally once a day  Tradjenta 5 mg oral tablet: 1 tab(s) orally once a day  Vascepa 1 g oral capsule: 2 cap(s) orally 2 times a day  Xarelto 15 mg oral tablet: 1 tab(s) orally once a day (in the evening)        CURRENT MEDICATIONS:   chlorhexidine 4% Liquid 1 Application(s) Topical once  potassium chloride    Tablet ER 20 milliEquivalent(s) Oral Once  sodium chloride 0.9%. 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 Follow-up with PMD/Cardiologist Dr. Peres in 72 hours.  2. Post procedure labs/EKG reviewed and stable.    3. Pt given instructions on importance of taking antiplatelet medication.    4. Stable for discharge as per attending Dr. Smalls after bed rest, pt voids, groin/wrist stable and 30 minutes of ambulation.  5. Prescriptions for Plavix e-prescribed and submitted to patient's pharmacy: Yes. Pt is to continue Xarelto and NOT TAKE ASPIRIN.  6. Patient will continue Crestor 5mg daily and Ranexa 1000mg twice daily   7. Patient will continue All Other Home Medications.   8. Discharge forms signed and copies in chart   9. Discharge Order Entered Yes      Interventional Cardiology PA Post PCI SDA Discharge Note    Patient without complaints. Ambulated and voided without difficulties    Afebrile, VSS    PRESCRIPTIONS/HOME MEDICATIONS:  amLODIPine 10 mg oral tablet: 1 tab(s) orally once a day  Crestor 5 mg oral tablet: 1 tab(s) orally once a day  glipiZIDE 2.5 mg oral tablet, extended release: 1 tab(s) orally once a day  hydrALAZINE 50 mg oral tablet: 1 tab(s) orally 3 times a day  Metoprolol Succinate ER: 125 milligram(s) orally once a day  NexIUM 40 mg oral delayed release capsule: 1 cap(s) orally once a day  Nitrostat 0.4 mg sublingual tablet: 1 tab(s) sublingual every 5 minutes, As Needed  Ranexa 1000 mg oral tablet, extended release: 1 tab(s) orally 2 times a day  Senna Lax 8.6 mg oral tablet: 2 tab(s) orally once a day (at bedtime)  tamsulosin 0.4 mg oral capsule: 1 cap(s) orally once a day  Tradjenta 5 mg oral tablet: 1 tab(s) orally once a day  Vascepa 1 g oral capsule: 2 cap(s) orally 2 times a day  Xarelto 15 mg oral tablet: 1 tab(s) orally once a day (in the evening)        CURRENT MEDICATIONS:   chlorhexidine 4% Liquid 1 Application(s) Topical once  potassium chloride    Tablet ER 20 milliEquivalent(s) Oral Once  sodium chloride 0.9%. 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 Follow-up with PMD/Cardiologist Dr. Peres in 72 hours.  2. Post procedure labs/EKG reviewed and stable.    3. Pt given instructions on importance of taking antiplatelet medication.    4. Stable for discharge as per attending Dr. Smalls after bed rest, pt voids, groin/wrist stable and 30 minutes of ambulation.  5. Prescriptions for Plavix e-prescribed and submitted to patient's pharmacy: Yes. Pt is to continue Xarelto and NOT TAKE ASPIRIN. Dr. Peres to optimize medical treatment for myocardial bridge as outpatient.    6. Patient will continue Crestor 5mg daily and Ranexa 1000mg twice daily   7. Patient will continue All Other Home Medications.   8. Discharge forms signed and copies in chart   9. Discharge Order Entered Yes      Interventional Cardiology PA Post PCI SDA Discharge Note    Patient without complaints. Ambulated and voided without difficulties    Afebrile, VSS    PRESCRIPTIONS/HOME MEDICATIONS:  amLODIPine 10 mg oral tablet: 1 tab(s) orally once a day  Crestor 5 mg oral tablet: 1 tab(s) orally once a day  glipiZIDE 2.5 mg oral tablet, extended release: 1 tab(s) orally once a day  hydrALAZINE 50 mg oral tablet: 1 tab(s) orally 3 times a day  Metoprolol Succinate ER: 125 milligram(s) orally once a day  NexIUM 40 mg oral delayed release capsule: 1 cap(s) orally once a day  Nitrostat 0.4 mg sublingual tablet: 1 tab(s) sublingual every 5 minutes, As Needed  Ranexa 1000 mg oral tablet, extended release: 1 tab(s) orally 2 times a day  Senna Lax 8.6 mg oral tablet: 2 tab(s) orally once a day (at bedtime)  tamsulosin 0.4 mg oral capsule: 1 cap(s) orally once a day  Tradjenta 5 mg oral tablet: 1 tab(s) orally once a day  Vascepa 1 g oral capsule: 2 cap(s) orally 2 times a day  Xarelto 15 mg oral tablet: 1 tab(s) orally once a day (in the evening)        CURRENT MEDICATIONS:   chlorhexidine 4% Liquid 1 Application(s) Topical once  potassium chloride    Tablet ER 20 milliEquivalent(s) Oral Once  sodium chloride 0.9%. 500 milliLiter(s) IV Continuous <Continuous>        Ext:  Right Radial : small hematoma proximal to radial band, 2nd TR placed, no bleeding, dressing; C/D/I  Post band removal small hematoma compressed for 10 minutes with resolution, radial pulse intact.       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 Follow-up with PMD/Cardiologist Dr. Peres in 72 hours.  2. Post procedure labs/EKG reviewed and stable.    3. Pt given instructions on importance of taking antiplatelet medication.    4. Stable for discharge as per attending Dr. Smalls after bed rest, pt voids, groin/wrist stable and 30 minutes of ambulation.  5. Prescriptions for Plavix e-prescribed and submitted to patient's pharmacy: Yes. Pt is to continue Xarelto and NOT TAKE ASPIRIN. Dr. Peres to optimize medical treatment for myocardial bridge as outpatient.    6. Patient will continue Crestor 5mg daily and Ranexa 1000mg twice daily   7. Patient will continue All Other Home Medications.   8. Discharge forms signed and copies in chart   9. Discharge Order Entered Yes

## 2022-09-16 DIAGNOSIS — I25.10 ATHEROSCLEROTIC HEART DISEASE OF NATIVE CORONARY ARTERY WITHOUT ANGINA PECTORIS: ICD-10-CM

## 2022-09-16 DIAGNOSIS — R94.39 ABNORMAL RESULT OF OTHER CARDIOVASCULAR FUNCTION STUDY: ICD-10-CM

## 2022-09-16 DIAGNOSIS — Z82.49 FAMILY HISTORY OF ISCHEMIC HEART DISEASE AND OTHER DISEASES OF THE CIRCULATORY SYSTEM: ICD-10-CM

## 2022-09-16 LAB — POCT ISTAT CREATININE: 1.6 MG/DL — HIGH (ref 0.5–1.3)

## 2023-09-15 ENCOUNTER — APPOINTMENT (OUTPATIENT)
Dept: THORACIC SURGERY | Facility: CLINIC | Age: 78
End: 2023-09-15

## 2023-10-27 ENCOUNTER — APPOINTMENT (OUTPATIENT)
Dept: THORACIC SURGERY | Facility: CLINIC | Age: 78
End: 2023-10-27
Payer: MEDICARE

## 2023-10-27 VITALS
TEMPERATURE: 97.7 F | HEART RATE: 66 BPM | WEIGHT: 197 LBS | BODY MASS INDEX: 30.92 KG/M2 | OXYGEN SATURATION: 95 % | HEIGHT: 67 IN | DIASTOLIC BLOOD PRESSURE: 70 MMHG | SYSTOLIC BLOOD PRESSURE: 110 MMHG | RESPIRATION RATE: 19 BRPM

## 2023-10-27 PROCEDURE — 99213 OFFICE O/P EST LOW 20 MIN: CPT

## 2024-05-09 PROBLEM — E32.8 THYMIC CYST: Status: ACTIVE | Noted: 2021-08-06

## 2024-05-10 ENCOUNTER — APPOINTMENT (OUTPATIENT)
Dept: THORACIC SURGERY | Facility: CLINIC | Age: 79
End: 2024-05-10
Payer: MEDICARE

## 2024-05-10 VITALS
TEMPERATURE: 97.5 F | OXYGEN SATURATION: 96 % | BODY MASS INDEX: 32.33 KG/M2 | DIASTOLIC BLOOD PRESSURE: 83 MMHG | WEIGHT: 206 LBS | HEART RATE: 72 BPM | SYSTOLIC BLOOD PRESSURE: 137 MMHG | HEIGHT: 67 IN

## 2024-05-10 DIAGNOSIS — E32.8 OTHER DISEASES OF THYMUS: ICD-10-CM

## 2024-05-10 DIAGNOSIS — Q27.30 ARTERIOVENOUS MALFORMATION, SITE UNSPECIFIED: ICD-10-CM

## 2024-05-10 PROCEDURE — 99214 OFFICE O/P EST MOD 30 MIN: CPT

## 2024-05-14 PROBLEM — Q27.30: Status: ACTIVE | Noted: 2021-09-20

## 2024-05-14 NOTE — HISTORY OF PRESENT ILLNESS
[FreeTextEntry1] : 78-year-old male, Mandarin speaking, PMHx of DM, HTN, BPH, AFIB on Xarelto, monoclonal gammopathy. He was initially referred by Dr. 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   No surgical intervention was done. Patient was put under observation with serial CTs and MRIs.  He presents today with a follow up CT chest with IV contrast. Reports chronic shortness of breath on mild exertion without cough or hemoptysis.   CT chest with IV contrast on 05/06/2024 - The 5x2x4.3cm anterior mediastinal cyst is slightly larger compared with 08/21 but stable since the more recent chest MRI.  - The right hilar enhancing lesion seems to communicate the right upper pulmonary vein.  This was not performed with CTA protocol therefore communication with the pulmonary artery cannot be assessed. Correlation for any symptoms of SOB or hemoptysis is recommended.  - Mild left atrial enlargement.  - coronary stent - Evidence of prior granulomatous disease.

## 2024-05-14 NOTE — PHYSICAL EXAM
[] : no respiratory distress [Auscultation Breath Sounds / Voice Sounds] : lungs were clear to auscultation bilaterally [Heart Rate And Rhythm] : heart rate was normal and rhythm regular [Heart Sounds] : normal S1 and S2 [Heart Sounds Gallop] : no gallops [Murmurs] : no murmurs [Heart Sounds Pericardial Friction Rub] : no pericardial rub [Deep Tendon Reflexes (DTR)] : deep tendon reflexes were 2+ and symmetric [Sensation] : the sensory exam was normal to light touch and pinprick [No Focal Deficits] : no focal deficits [Oriented To Time, Place, And Person] : oriented to person, place, and time [Impaired Insight] : insight and judgment were intact [Affect] : the affect was normal

## 2024-05-14 NOTE — ASSESSMENT
[FreeTextEntry1] : 78-year-old male, Mandarin speaking, PMHx of DM, HTN, BPH, AFIB on Xarelto, monoclonal gammopathy. He was initially referred by Dr. 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  MRI chest on 07/07/21 reviewed a 2.3 x 1.1cm anterior mediastinal lesion, compatible with thymic cyst.  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  No surgical intervention was done.   I personally reviewed and interpreted the follow up CT chest with IV contrast completed on 05/06/2024. The anterior mediastinal cyst is now around 5x2x4.3cm slightly larger compared with 08/21 but stable since the more recent chest MRI. I would continue to observe it given its benign appearance. The right hilar arteriovenous varix is not well appreciated. Patient admits to chronic SOB without hemoptysis.  We will continue observe it with a CTA in 6 months.   Patient was advised to contact us should any concerning symptoms arise. Patient verbally understood and agreed with the plan.   Plan: CT chest with CTA protocol in 6 months.   I, Dr. Matt Munoz MD, personally performed the evaluation and management (E/M) services for this established patient who presents today with (a) new problem(s)/exacerbation of (an) existing condition(s).  That E/M includes conducting the clinically appropriate interval history &/or exam, assessing all new/exacerbated conditions, and establishing a new plan of care.  Today, my NP, Jessica Mays, was here to observe &/or participate in the visit & follow plan of care established by me.

## 2024-05-14 NOTE — REVIEW OF SYSTEMS
[Shortness Of Breath] : shortness of breath [Cough] : no cough [SOB on Exertion] : shortness of breath during exertion [Arthralgias] : arthralgias [Joint Stiffness] : joint stiffness [Negative] : Psychiatric

## 2024-05-14 NOTE — CONSULT LETTER
[Dear  ___] : Dear ~PEPPER, [Courtesy Letter:] : I had the pleasure of seeing your patient, [unfilled], in my office today. [Please see my note below.] : Please see my note below. [Consult Closing:] : Thank you very much for allowing me to participate in the care of this patient.  If you have any questions, please do not hesitate to contact me. [Sincerely,] : Sincerely, [FreeTextEntry2] : Patric Diana Dr. [FreeTextEntry3] : Matt Munoz MD Professor, Cardiovascular & Thoracic Surgery [facility] [employer] Director of the [facility]  Director of Thoracic Surgery, [address] [place] [address] Phone: [phone] Fax: [phone]

## 2024-05-14 NOTE — DATA REVIEWED
[FreeTextEntry1] : MRI chest on 09/20/2023 - Anterior mediastinal/thymic cyst (4.1 x 1.9 x 5.4cm T1 hypointense, T2 hyperintense), stable to minimally increased in size since 09/2022 (4.2 x 1.8 x 4.9cm). - Stable 2.7 cm avidly enhancing right perihilar vascular lesion located adjacent to the right middle pulmonary artery and right superior pulmonary vein. The differential is unchanged and includes an aneurysm associated with the pulmonary artery or vein, versus an arteriovenous malformation. - Stable mild cardiomegaly.  MRI chest w/o and w/ contrast on 09/08/22 - Stable 3.3 cm anterior mediastinal/thymic cyst. - 2.7 cm avidly enhancing right perihilar lesion located adjacent to the right middle pulmonary artery and right superior pulmonary vein. This is grossly stable in size since 07/2021. - Trace bilateral pleural effusions. - Stable mild cardiomegaly.  PET 09/11/2021 - 2.5 x 1.6 cm low-attenuation collection may represent a complex cyst - no FDG activity - stable 2.7 x 2.2 cm well-circumscribed low-attenuation nodule abutting the right superior pulmonary vein, SUV 3.2 - trace bilateral pleural effusions decreased in volume  CT Chest 8/12/21 - Right perihilar soft tissue nodule measuring up to 2.7cm, which abuts the right superior pulmonary vein. Differential diagnosis includes pulmonary nodule vs aneurysm. - Anterior mediastinal lesion measuring up to 2.0cm with soft tissue attenuation. - Small bilateral pleural effusion - Sequela of prior granulomatous disease - CAD  MRI Chest 7/7/21 - anterior mediastinal lesion 2.1 x 1.1cm, compatible with thymic cyst.

## 2024-12-20 ENCOUNTER — NON-APPOINTMENT (OUTPATIENT)
Age: 79
End: 2024-12-20

## 2025-05-09 PROBLEM — E11.22 TYPE 2 DIABETES MELLITUS WITH CHRONIC KIDNEY DISEASE, WITHOUT LONG-TERM CURRENT USE OF INSULIN, UNSPECIFIED CKD STAGE: Status: ACTIVE | Noted: 2025-05-09

## 2025-05-09 PROBLEM — I48.19 PERSISTENT ATRIAL FIBRILLATION: Status: ACTIVE | Noted: 2025-05-09

## 2025-05-09 PROBLEM — I50.30 HEART FAILURE WITH PRESERVED EJECTION FRACTION: Status: ACTIVE | Noted: 2025-05-09

## 2025-05-09 PROBLEM — I10 PRIMARY HYPERTENSION: Status: ACTIVE | Noted: 2025-05-09

## 2025-05-12 ENCOUNTER — APPOINTMENT (OUTPATIENT)
Facility: CLINIC | Age: 80
End: 2025-05-12
Payer: MEDICARE

## 2025-05-12 VITALS
BODY MASS INDEX: 28.82 KG/M2 | WEIGHT: 184 LBS | OXYGEN SATURATION: 95 % | SYSTOLIC BLOOD PRESSURE: 128 MMHG | DIASTOLIC BLOOD PRESSURE: 73 MMHG | TEMPERATURE: 97 F | HEART RATE: 98 BPM

## 2025-05-12 DIAGNOSIS — E11.22 TYPE 2 DIABETES MELLITUS WITH DIABETIC CHRONIC KIDNEY DISEASE: ICD-10-CM

## 2025-05-12 DIAGNOSIS — I50.30 UNSPECIFIED DIASTOLIC (CONGESTIVE) HEART FAILURE: ICD-10-CM

## 2025-05-12 DIAGNOSIS — I48.19 OTHER PERSISTENT ATRIAL FIBRILLATION: ICD-10-CM

## 2025-05-12 DIAGNOSIS — I10 ESSENTIAL (PRIMARY) HYPERTENSION: ICD-10-CM

## 2025-05-12 PROCEDURE — 93000 ELECTROCARDIOGRAM COMPLETE: CPT

## 2025-05-12 PROCEDURE — 99205 OFFICE O/P NEW HI 60 MIN: CPT | Mod: 25

## 2025-05-12 RX ORDER — AMIODARONE HYDROCHLORIDE 200 MG/1
200 TABLET ORAL
Qty: 180 | Refills: 3 | Status: ACTIVE | COMMUNITY
Start: 2025-05-12 | End: 1900-01-01

## 2025-06-16 ENCOUNTER — APPOINTMENT (OUTPATIENT)
Facility: CLINIC | Age: 80
End: 2025-06-16
Payer: MEDICARE

## 2025-06-16 VITALS
SYSTOLIC BLOOD PRESSURE: 136 MMHG | TEMPERATURE: 97 F | HEART RATE: 71 BPM | OXYGEN SATURATION: 96 % | DIASTOLIC BLOOD PRESSURE: 88 MMHG | BODY MASS INDEX: 30.23 KG/M2 | WEIGHT: 193 LBS

## 2025-06-16 PROCEDURE — 99214 OFFICE O/P EST MOD 30 MIN: CPT | Mod: 25

## 2025-06-16 PROCEDURE — 93000 ELECTROCARDIOGRAM COMPLETE: CPT

## 2025-09-15 ENCOUNTER — APPOINTMENT (OUTPATIENT)
Facility: CLINIC | Age: 80
End: 2025-09-15
Payer: MEDICARE

## 2025-09-15 VITALS
WEIGHT: 198 LBS | BODY MASS INDEX: 31.01 KG/M2 | HEART RATE: 76 BPM | TEMPERATURE: 97.7 F | OXYGEN SATURATION: 96 % | DIASTOLIC BLOOD PRESSURE: 85 MMHG | SYSTOLIC BLOOD PRESSURE: 152 MMHG

## 2025-09-15 DIAGNOSIS — I48.19 OTHER PERSISTENT ATRIAL FIBRILLATION: ICD-10-CM

## 2025-09-15 PROCEDURE — 99214 OFFICE O/P EST MOD 30 MIN: CPT | Mod: 25

## 2025-09-15 PROCEDURE — 93000 ELECTROCARDIOGRAM COMPLETE: CPT
